# Patient Record
Sex: FEMALE | URBAN - METROPOLITAN AREA
[De-identification: names, ages, dates, MRNs, and addresses within clinical notes are randomized per-mention and may not be internally consistent; named-entity substitution may affect disease eponyms.]

---

## 2021-10-05 NOTE — PATIENT INSTRUCTIONS
The [place] [id number] will call you with therapist options soon. I placed fasting lab orders through THE [facility]. Schedule your physical with pap. We will call you once we have authorization from your insurance to order Nexplanon.

## 2021-10-05 NOTE — PROGRESS NOTES
Subjective:   Patient ID: Zohaib Terrell is a 19year old female. HPI Here to establish care. Would like to discuss birth control options. LMP was 47 days ago. Does have irregular periods. Currently sexually with a male partner, using condoms.  No known normal.         Assessment & Plan:   Mild episode of recurrent major depressive disorder ([facility] [address]  (primary encounter diagnosis)  Screening, lipid  Screening for deficiency anemia  Screening for diabetes mellitus  Screening for thyroid disorder  Vitamin D def

## 2021-10-06 ENCOUNTER — TELEPHONE (OUTPATIENT)
Dept: INTERNAL MEDICINE CLINIC | Facility: CLINIC | Age: 21
End: 2021-10-06

## 2021-10-06 NOTE — TELEPHONE ENCOUNTER
Faxed signed nexplanon enrollment form to [phone]. Confirmation received. Will hold on to forms until an answer is received.

## 2021-10-14 ENCOUNTER — TELEPHONE (OUTPATIENT)
Dept: INTERNAL MEDICINE CLINIC | Facility: CLINIC | Age: 21
End: 2021-10-14

## 2021-10-14 NOTE — TELEPHONE ENCOUNTER
Called pt's pharmacy to ask if they have nexplanon or are able to order with prescription. Pharmacist stated that they don't do this and it might have to be done with a special pharmacy. Routed this to AMS to review.

## 2021-10-15 NOTE — TELEPHONE ENCOUNTER
Please call patient to let her know she'll need to find out what her mail order option is with her insurance.  For example[facility], [facility], [facility], etc. She'll need to then sign up for the mail order and then we can send her

## 2021-10-20 NOTE — TELEPHONE ENCOUNTER
Pt called to find out what was happening with the nexplenon rx-I gave her info below from [facility] for her to set up mail order pharmacy and then call us to let us know when set up

## 2021-10-22 NOTE — TELEPHONE ENCOUNTER
Called pt to get an update regarding message below. Pt stated she will try to call today to find out which mail pharmacy option is with her insurance. Advised pt to return call and ask to speak to me if she had further questions.

## 2021-11-15 NOTE — TELEPHONE ENCOUNTER
Called pt to obtain an update on Nexplanon. Pt stated she has not had time to call her insurance to find out the specialty pharmacy. Pt stated she will call later this week to find out and call us back.

## 2021-11-19 NOTE — TELEPHONE ENCOUNTER
Pt stated she called her [employer] company spoke with 4 different people and no one was able to tell her what specialty pharmacy she will need to go thought, They told her more info is needed from the doctor to get it going.  Please advise

## 2021-11-19 NOTE — TELEPHONE ENCOUNTER
Returned pt's call. Pt stated she spoke to 4 different people and the last person who she spoke to told her they needed more information in order to initiate the process for Nexplanon. I informed pt we have not received a call or a fax.  Informed pt I will

## 2022-05-19 ENCOUNTER — HOSPITAL ENCOUNTER (EMERGENCY)
Facility: HOSPITAL | Age: 22
Discharge: HOME | End: 2022-05-19
Attending: EMERGENCY MEDICINE
Payer: COMMERCIAL

## 2022-05-19 VITALS
TEMPERATURE: 99 F | RESPIRATION RATE: 15 BRPM | HEIGHT: 63.5 IN | WEIGHT: 252.88 LBS | BODY MASS INDEX: 44.25 KG/M2 | DIASTOLIC BLOOD PRESSURE: 61 MMHG | SYSTOLIC BLOOD PRESSURE: 137 MMHG | HEART RATE: 67 BPM | OXYGEN SATURATION: 97 %

## 2022-05-19 DIAGNOSIS — R31.9 URINARY TRACT INFECTION WITH HEMATURIA, SITE UNSPECIFIED: ICD-10-CM

## 2022-05-19 DIAGNOSIS — N93.8 DUB (DYSFUNCTIONAL UTERINE BLEEDING): Primary | ICD-10-CM

## 2022-05-19 DIAGNOSIS — N39.0 URINARY TRACT INFECTION WITH HEMATURIA, SITE UNSPECIFIED: ICD-10-CM

## 2022-05-19 LAB
ALBUMIN SERPL-MCNC: 3.4 G/DL (ref 3.4–5)
ALBUMIN/GLOB SERPL: 0.7 {RATIO} (ref 1–2)
ALP LIVER SERPL-CCNC: 55 U/L
ALT SERPL-CCNC: 21 U/L
ANION GAP SERPL CALC-SCNC: 6 MMOL/L (ref 0–18)
AST SERPL-CCNC: 10 U/L (ref 15–37)
B-HCG UR QL: NEGATIVE
BASOPHILS # BLD AUTO: 0.06 X10(3) UL (ref 0–0.2)
BASOPHILS NFR BLD AUTO: 0.4 %
BILIRUB SERPL-MCNC: 0.4 MG/DL (ref 0.1–2)
BILIRUB UR QL CFM: NEGATIVE
BUN BLD-MCNC: 18 MG/DL (ref 7–18)
CALCIUM BLD-MCNC: 8.4 MG/DL (ref 8.5–10.1)
CHLORIDE SERPL-SCNC: 110 MMOL/L (ref 98–112)
CO2 SERPL-SCNC: 24 MMOL/L (ref 21–32)
CREAT BLD-MCNC: 0.64 MG/DL
EOSINOPHIL # BLD AUTO: 0.07 X10(3) UL (ref 0–0.7)
EOSINOPHIL NFR BLD AUTO: 0.5 %
ERYTHROCYTE [DISTWIDTH] IN BLOOD BY AUTOMATED COUNT: 17.3 %
GLOBULIN PLAS-MCNC: 4.9 G/DL (ref 2.8–4.4)
GLUCOSE BLD-MCNC: 88 MG/DL (ref 70–99)
GLUCOSE UR STRIP.AUTO-MCNC: NEGATIVE MG/DL
HCT VFR BLD AUTO: 37.6 %
HGB BLD-MCNC: 11.5 G/DL
IMM GRANULOCYTES # BLD AUTO: 0.05 X10(3) UL (ref 0–1)
IMM GRANULOCYTES NFR BLD: 0.4 %
LYMPHOCYTES # BLD AUTO: 4.31 X10(3) UL (ref 1–4)
LYMPHOCYTES NFR BLD AUTO: 31.8 %
MCH RBC QN AUTO: 22.7 PG (ref 26–34)
MCHC RBC AUTO-ENTMCNC: 30.6 G/DL (ref 31–37)
MCV RBC AUTO: 74.3 FL
MONOCYTES # BLD AUTO: 0.76 X10(3) UL (ref 0.1–1)
MONOCYTES NFR BLD AUTO: 5.6 %
NEUTROPHILS # BLD AUTO: 8.32 X10 (3) UL (ref 1.5–7.7)
NEUTROPHILS # BLD AUTO: 8.32 X10(3) UL (ref 1.5–7.7)
NEUTROPHILS NFR BLD AUTO: 61.3 %
NITRITE UR QL STRIP.AUTO: POSITIVE
OSMOLALITY SERPL CALC.SUM OF ELEC: 291 MOSM/KG (ref 275–295)
PH UR STRIP.AUTO: 6 [PH] (ref 5–8)
PLATELET # BLD AUTO: 308 10(3)UL (ref 150–450)
POTASSIUM SERPL-SCNC: 3.4 MMOL/L (ref 3.5–5.1)
PROT SERPL-MCNC: 8.3 G/DL (ref 6.4–8.2)
PROT UR STRIP.AUTO-MCNC: >=300 MG/DL
RBC # BLD AUTO: 5.06 X10(6)UL
RBC #/AREA URNS AUTO: >10 /HPF
SODIUM SERPL-SCNC: 140 MMOL/L (ref 136–145)
SP GR UR STRIP.AUTO: 1.02 (ref 1–1.03)
UROBILINOGEN UR STRIP.AUTO-MCNC: 1 MG/DL
WBC # BLD AUTO: 13.6 X10(3) UL (ref 4–11)

## 2022-05-19 PROCEDURE — 81015 MICROSCOPIC EXAM OF URINE: CPT | Performed by: EMERGENCY MEDICINE

## 2022-05-19 PROCEDURE — 81025 URINE PREGNANCY TEST: CPT

## 2022-05-19 PROCEDURE — 96360 HYDRATION IV INFUSION INIT: CPT

## 2022-05-19 PROCEDURE — 80053 COMPREHEN METABOLIC PANEL: CPT

## 2022-05-19 PROCEDURE — 99284 EMERGENCY DEPT VISIT MOD MDM: CPT

## 2022-05-19 PROCEDURE — 85025 COMPLETE CBC W/AUTO DIFF WBC: CPT | Performed by: EMERGENCY MEDICINE

## 2022-05-19 PROCEDURE — 87086 URINE CULTURE/COLONY COUNT: CPT | Performed by: EMERGENCY MEDICINE

## 2022-05-19 PROCEDURE — 80053 COMPREHEN METABOLIC PANEL: CPT | Performed by: EMERGENCY MEDICINE

## 2022-05-19 PROCEDURE — 81001 URINALYSIS AUTO W/SCOPE: CPT | Performed by: EMERGENCY MEDICINE

## 2022-05-19 PROCEDURE — 85025 COMPLETE CBC W/AUTO DIFF WBC: CPT

## 2022-05-19 RX ORDER — ETONOGESTREL 68 MG/1
IMPLANT SUBCUTANEOUS
COMMUNITY

## 2022-05-19 RX ORDER — NITROFURANTOIN 25; 75 MG/1; MG/1
100 CAPSULE ORAL 2 TIMES DAILY
Qty: 20 CAPSULE | Refills: 0 | Status: SHIPPED | OUTPATIENT
Start: 2022-05-19 | End: 2022-05-29

## 2022-05-19 RX ORDER — NAPROXEN 500 MG/1
500 TABLET ORAL 2 TIMES DAILY PRN
Qty: 20 TABLET | Refills: 0 | Status: SHIPPED | OUTPATIENT
Start: 2022-05-19

## 2022-05-19 RX ORDER — ACETAMINOPHEN 500 MG
1000 TABLET ORAL EVERY 6 HOURS PRN
COMMUNITY

## 2022-05-19 NOTE — ED INITIAL ASSESSMENT (HPI)
Had Nexplenon inserted on the 29th of April. On second day of menstrual cycle and is passing clots, period is so heavy. Gone through six pads today. C/o dizziness. Hasn't had a period this heavy before.

## 2024-04-22 ENCOUNTER — OFFICE VISIT (OUTPATIENT)
Dept: FAMILY MEDICINE CLINIC | Facility: CLINIC | Age: 24
End: 2024-04-22
Payer: COMMERCIAL

## 2024-04-22 VITALS
BODY MASS INDEX: 46.02 KG/M2 | HEIGHT: 63.5 IN | TEMPERATURE: 99 F | RESPIRATION RATE: 16 BRPM | WEIGHT: 263 LBS | HEART RATE: 83 BPM | SYSTOLIC BLOOD PRESSURE: 120 MMHG | OXYGEN SATURATION: 98 % | DIASTOLIC BLOOD PRESSURE: 80 MMHG

## 2024-04-22 DIAGNOSIS — R09.82 PND (POST-NASAL DRIP): ICD-10-CM

## 2024-04-22 DIAGNOSIS — J01.90 ACUTE SINUSITIS WITH SYMPTOMS GREATER THAN 10 DAYS: Primary | ICD-10-CM

## 2024-04-22 PROCEDURE — 99213 OFFICE O/P EST LOW 20 MIN: CPT | Performed by: NURSE PRACTITIONER

## 2024-04-22 RX ORDER — FLUTICASONE PROPIONATE 50 MCG
2 SPRAY, SUSPENSION (ML) NASAL DAILY
Qty: 1 EACH | Refills: 0 | Status: SHIPPED | OUTPATIENT
Start: 2024-04-22

## 2024-04-22 RX ORDER — BENZONATATE 200 MG/1
200 CAPSULE ORAL 3 TIMES DAILY PRN
Qty: 30 CAPSULE | Refills: 0 | Status: SHIPPED | OUTPATIENT
Start: 2024-04-22

## 2024-04-22 RX ORDER — AMOXICILLIN AND CLAVULANATE POTASSIUM 875; 125 MG/1; MG/1
1 TABLET, FILM COATED ORAL 2 TIMES DAILY
Qty: 20 TABLET | Refills: 0 | Status: SHIPPED | OUTPATIENT
Start: 2024-04-22 | End: 2024-05-02

## 2024-04-22 NOTE — PROGRESS NOTES
Anu Vargas is a 23 year old female.   Chief Complaint   Patient presents with    Cough     Cough mucous throat irritation, in morning is worse with stuffy nose, x 2 weeks.  Stated had a cold      HPI:    Symptoms started 2 weeks ago with purulent nasal discharge, maxillary sinus pressure, and headache, a lot of  post-nasal drip.Worsening.    Allergies:  Allergies   Allergen Reactions    Black Ralph Pollen Allergy Skin Test ANAPHYLAXIS    Soybean Oil ANAPHYLAXIS          Current Outpatient Medications   Medication Sig Dispense Refill    amoxicillin clavulanate 875-125 MG Oral Tab Take 1 tablet by mouth 2 (two) times daily for 10 days. 20 tablet 0    fluticasone propionate 50 MCG/ACT Nasal Suspension 2 sprays by Each Nare route daily. 1 each 0    benzonatate 200 MG Oral Cap Take 1 capsule (200 mg total) by mouth 3 (three) times daily as needed. 30 capsule 0    Etonogestrel (NEXPLANON) 68 MG Subcutaneous Implant Inject into the skin.      acetaminophen 500 MG Oral Tab Take 1,000 mg by mouth every 6 (six) hours as needed for Pain. (Patient not taking: Reported on 4/22/2024)      naproxen 500 MG Oral Tab Take 1 tablet (500 mg total) by mouth 2 (two) times daily as needed. (Patient not taking: Reported on 4/22/2024) 20 tablet 0      No past medical history on file.   No past surgical history on file.         ROS:   GENERAL HEALTH: otherwise feels well  EYES: no visual complaints or deficits  RESPIRATORY: no shortness of breath, wheezing   CARDIOVASCULAR: no chest pain or SOB.  GI: denies nausea, vomiting.  PSYCHE: no symptoms of depression or anxiety      PE:  /80   Pulse 83   Temp 98.6 °F (37 °C) (Oral)   Resp 16   Ht 5' 3.5\" (1.613 m)   Wt 263 lb (119.3 kg)   SpO2 98%   BMI 45.86 kg/m²   General: WD/WN in no acute distress.   Eyes & ears: conjunctiva clear, sclera white; TM’s non-bulging without erythema.   Sinuses maxillary: tender to percussion.   Nares: red mucosa and thick yellow discharge, no  septal deviations.   Mouth: moist with mild erythema, no exudates, and + PND.   Neck: Supple with no cervical LAD.   Lungs: Clear to auscultation bilaterally; no wheeze, rhonchi, or rales.    Heart: S1/S2 regular no murmurs.      ASSESSMENT/ PLAN:     Encounter Diagnoses   Name Primary?    Acute sinusitis with symptoms greater than 10 days Yes    PND (post-nasal drip)        Meds & Refills for this Visit:  Requested Prescriptions     Signed Prescriptions Disp Refills    amoxicillin clavulanate 875-125 MG Oral Tab 20 tablet 0     Sig: Take 1 tablet by mouth 2 (two) times daily for 10 days.    fluticasone propionate 50 MCG/ACT Nasal Suspension 1 each 0     Si sprays by Each Nare route daily.    benzonatate 200 MG Oral Cap 30 capsule 0     Sig: Take 1 capsule (200 mg total) by mouth 3 (three) times daily as needed.       Imaging & Consults:  None    Increase fluids, rest, Tylenol or Ibuprofen prn, f/u in 5 days if not better.

## 2024-08-28 ENCOUNTER — OFFICE VISIT (OUTPATIENT)
Dept: FAMILY MEDICINE CLINIC | Facility: CLINIC | Age: 24
End: 2024-08-28
Payer: COMMERCIAL

## 2024-08-28 VITALS
RESPIRATION RATE: 18 BRPM | OXYGEN SATURATION: 98 % | WEIGHT: 267 LBS | DIASTOLIC BLOOD PRESSURE: 76 MMHG | TEMPERATURE: 98 F | BODY MASS INDEX: 47 KG/M2 | HEART RATE: 102 BPM | SYSTOLIC BLOOD PRESSURE: 122 MMHG

## 2024-08-28 DIAGNOSIS — R09.82 POST-NASAL DRIP: ICD-10-CM

## 2024-08-28 DIAGNOSIS — H69.93 DYSFUNCTION OF BOTH EUSTACHIAN TUBES: Primary | ICD-10-CM

## 2024-08-28 PROCEDURE — 99213 OFFICE O/P EST LOW 20 MIN: CPT

## 2024-08-28 NOTE — PROGRESS NOTES
Subjective:   Patient ID: Anu Vargas is a 23 year old female.    Patient presents with 2 days of bilateral ear pain, sore throat and runny nose. History of seasonal allergies. Reports taking nyquil for symptoms. Denies exposures or other members in house with same symptoms.     Ear Problem   There is pain in both ears. This is a new problem. The current episode started yesterday. The problem occurs every few hours. The problem has been unchanged. There has been no fever. Associated symptoms include rhinorrhea and a sore throat. Pertinent negatives include no ear discharge.       History/Other:   Review of Systems   Constitutional:  Negative for fever.   HENT:  Positive for ear pain, rhinorrhea and sore throat. Negative for ear discharge.    Respiratory:  Negative for chest tightness and shortness of breath.    Cardiovascular:  Negative for chest pain.   All other systems reviewed and are negative.    Current Outpatient Medications   Medication Sig Dispense Refill    Etonogestrel (NEXPLANON) 68 MG Subcutaneous Implant Inject into the skin.      fluticasone propionate 50 MCG/ACT Nasal Suspension 2 sprays by Each Nare route daily. (Patient not taking: Reported on 8/28/2024) 1 each 0    benzonatate 200 MG Oral Cap Take 1 capsule (200 mg total) by mouth 3 (three) times daily as needed. (Patient not taking: Reported on 8/28/2024) 30 capsule 0    acetaminophen 500 MG Oral Tab Take 1,000 mg by mouth every 6 (six) hours as needed for Pain. (Patient not taking: Reported on 4/22/2024)      naproxen 500 MG Oral Tab Take 1 tablet (500 mg total) by mouth 2 (two) times daily as needed. (Patient not taking: Reported on 4/22/2024) 20 tablet 0     Allergies:  Allergies   Allergen Reactions    Black Kensett Pollen Allergy Skin Test ANAPHYLAXIS    Soybean Oil ANAPHYLAXIS       Objective:   Physical Exam  Vitals reviewed.   Constitutional:       General: She is not in acute distress.     Appearance: Normal appearance. She is obese.  She is not ill-appearing.   HENT:      Head: Normocephalic and atraumatic.      Right Ear: Ear canal and external ear normal. A middle ear effusion is present. Tympanic membrane is not erythematous, retracted or bulging.      Left Ear: Ear canal and external ear normal. A middle ear effusion is present. Tympanic membrane is not erythematous, retracted or bulging.      Nose: Congestion present.      Right Turbinates: Swollen.      Left Turbinates: Swollen.      Right Sinus: No maxillary sinus tenderness or frontal sinus tenderness.      Left Sinus: No maxillary sinus tenderness or frontal sinus tenderness.      Mouth/Throat:      Mouth: Mucous membranes are moist.      Pharynx: Oropharynx is clear. Uvula midline. No oropharyngeal exudate, posterior oropharyngeal erythema or uvula swelling.      Tonsils: No tonsillar exudate or tonsillar abscesses. 1+ on the right. 1+ on the left.      Comments: Non-purulent drainage  Cardiovascular:      Rate and Rhythm: Normal rate and regular rhythm.      Pulses: Normal pulses.      Heart sounds: Normal heart sounds.   Pulmonary:      Effort: Pulmonary effort is normal.      Breath sounds: Normal breath sounds.   Musculoskeletal:         General: Normal range of motion.      Cervical back: Normal range of motion and neck supple.   Lymphadenopathy:      Cervical: No cervical adenopathy.   Skin:     General: Skin is warm and dry.   Neurological:      General: No focal deficit present.      Mental Status: She is alert and oriented to person, place, and time.   Psychiatric:         Mood and Affect: Mood normal.         Behavior: Behavior normal.         Assessment & Plan:   1. Dysfunction of both eustachian tubes    2. Post-nasal drip        Discussion about probable viral/allergy cause of symptoms. Discussion about supportive treatment including Claritin, Flonase, salt water gargles and hydration. Declined viral testing.    Meds This Visit:  Requested Prescriptions      No  prescriptions requested or ordered in this encounter       Imaging & Referrals:  None

## 2025-03-12 ENCOUNTER — HOSPITAL ENCOUNTER (EMERGENCY)
Facility: HOSPITAL | Age: 25
Discharge: HOME | End: 2025-03-13
Attending: EMERGENCY MEDICINE
Payer: COMMERCIAL

## 2025-03-12 DIAGNOSIS — M79.10 MYALGIA: Primary | ICD-10-CM

## 2025-03-12 DIAGNOSIS — R20.2 PARESTHESIAS: ICD-10-CM

## 2025-03-12 PROCEDURE — 99283 EMERGENCY DEPT VISIT LOW MDM: CPT

## 2025-03-12 PROCEDURE — 93010 ELECTROCARDIOGRAM REPORT: CPT

## 2025-03-12 PROCEDURE — 93005 ELECTROCARDIOGRAM TRACING: CPT

## 2025-03-13 VITALS
RESPIRATION RATE: 18 BRPM | HEIGHT: 64 IN | DIASTOLIC BLOOD PRESSURE: 97 MMHG | TEMPERATURE: 98 F | OXYGEN SATURATION: 100 % | HEART RATE: 79 BPM | WEIGHT: 280 LBS | SYSTOLIC BLOOD PRESSURE: 137 MMHG | BODY MASS INDEX: 47.8 KG/M2

## 2025-03-13 LAB
ATRIAL RATE: 98 BPM
P AXIS: 25 DEGREES
P-R INTERVAL: 130 MS
Q-T INTERVAL: 374 MS
QRS DURATION: 96 MS
QTC CALCULATION (BEZET): 477 MS
R AXIS: 61 DEGREES
T AXIS: 22 DEGREES
VENTRICULAR RATE: 98 BPM

## 2025-03-13 RX ORDER — NAPROXEN 250 MG/1
500 TABLET ORAL ONCE
Status: COMPLETED | OUTPATIENT
Start: 2025-03-13 | End: 2025-03-13

## 2025-03-13 RX ORDER — METHOCARBAMOL 500 MG/1
500 TABLET, FILM COATED ORAL 4 TIMES DAILY
Qty: 20 TABLET | Refills: 0 | Status: SHIPPED | OUTPATIENT
Start: 2025-03-13 | End: 2025-03-18

## 2025-03-13 RX ORDER — NAPROXEN 500 MG/1
500 TABLET ORAL 2 TIMES DAILY PRN
Qty: 20 TABLET | Refills: 0 | Status: SHIPPED | OUTPATIENT
Start: 2025-03-13 | End: 2025-03-20

## 2025-03-13 RX ORDER — METHOCARBAMOL 500 MG/1
500 TABLET, FILM COATED ORAL ONCE
Status: COMPLETED | OUTPATIENT
Start: 2025-03-13 | End: 2025-03-13

## 2025-03-13 NOTE — ED INITIAL ASSESSMENT (HPI)
Pt ambulated into the ED with c/o left arm pain since this morning that has progressed to a numbing sensation in her mid-upper arm. Cap refill <3

## 2025-03-16 NOTE — ED PROVIDER NOTES
Patient Seen in: [facility] Emergency Department      History     Chief Complaint   Patient presents with    Numbness     Stated Complaint: L arm pain since this AM, L arm numbness x 1 hour    Subjective:   HPI    Patient is a 24-year-old female presenting to the ED with left upper extremity pain associate with paresthesias.  The history is obtained from patient who is a good historian.  The onset of her symptoms was earlier this morning.  No associated swelling.  The pain and paresthesias are isolated to the left forearm during my examination although it was reported to the mid upper arm during triage.  No chest pain or shortness of breath.  No complaints of neck pain.  No weakness or true loss of sensation.  No recent trauma or identifiable strain.  No similar episodes in the past.  Upon arrival pain is rated as 9 out of 10, nonradiating, left arm and forearm region, not well-characterized.  It is worse with palpation, no identified alleviating factors.  Patient denies pregnancy, has a Nexplanon.      Objective:     History reviewed. No pertinent past medical history.           History reviewed. No pertinent surgical history.             Social History     Socioeconomic History    Marital status: Single   Tobacco Use    Smoking status: Never    Smokeless tobacco: Never   Vaping Use    Vaping status: Never Used   Substance and Sexual Activity    Alcohol use: Not Currently    Drug use: Never     Social Drivers of Health      Received from [facility]    Housing Stability                  Physical Exam     ED Triage Vitals   BP 03/12/25 2133 (!) 166/93   Pulse 03/12/25 2133 90   Resp 03/12/25 2133 18   Temp 03/12/25 2133 97.8 °F (36.6 °C)   Temp src 03/12/25 2133 Temporal   SpO2 03/12/25 2133 98 %   O2 Device 03/13/25 0005 None (Room air)       Current Vitals:   No data recorded    Physical Exam  Vitals and nursing note reviewed.   Constitutional:       General: She is not in acute  distress.     Appearance: Normal appearance. She is well-developed. She is not ill-appearing or toxic-appearing.   HENT:      Head: Normocephalic and atraumatic.      Right Ear: External ear normal.      Left Ear: External ear normal.      Mouth/Throat:      Mouth: Mucous membranes are moist.      Pharynx: Oropharynx is clear.   Eyes:      Conjunctiva/sclera: Conjunctivae normal.   Neck:      Comments: No midline or left paravertebral cervical or upper thoracic tenderness  Cardiovascular:      Rate and Rhythm: Normal rate and regular rhythm.      Pulses: Normal pulses.      Heart sounds: No murmur heard.     Comments: Radial pulse 2/4 bilaterally  Pulmonary:      Effort: Pulmonary effort is normal.      Breath sounds: Normal breath sounds.   Abdominal:      General: Abdomen is flat. Bowel sounds are normal. There is no distension.      Tenderness: There is no abdominal tenderness.   Musculoskeletal:         General: Tenderness present. No swelling, deformity or signs of injury. Normal range of motion.      Cervical back: No tenderness.      Right lower leg: No edema.      Left lower leg: No edema.      Comments: Reproducible tenderness over the left forearm.  Full active range of motion of bilateral upper extremities without pain elicited during exam.   Skin:     General: Skin is warm.      Capillary Refill: Capillary refill takes less than 2 seconds.      Findings: No rash.   Neurological:      General: No focal deficit present.      Mental Status: She is alert and oriented to person, place, and time.      Sensory: No sensory deficit.      Motor: No weakness.      Comments: Motor strength 5/5 bilateral upper and lower extremity.  Sensation is intact bilaterally   Psychiatric:         Mood and Affect: Mood normal.         Behavior: Behavior normal.             ED Course   Labs Reviewed - No data to display  EKG    Rate, intervals and axes as noted on EKG Report.  Rate: 98  Rhythm: Sinus Rhythm  Reading: Normal                        MDM      History obtained from patient.     Differential diagnosis includes peripheral neuropathy, musculoskeletal pain or strain, symptoms do not involve the entire left upper extremity therefore this is not likely secondary to a TIA or CVA.  Cervical radiculopathy was considered although patient does not have any neck pain or tenderness on exam.  Symptoms are isolated to the left forearm.  Reproducible with palpation.  No evidence of trauma or bruising.  No swelling to suggest DVT and pain isolated to the forearm.  No obvious deformity or trauma to suggest fracture.  No rash or fever to suggest infectious cause for symptoms.  Patient has had paresthesias but no true loss of sensation elicited on exam.  Motor strength is equal.  No chest pain, shortness of breath to suggest cardiac etiology of her symptoms and patient does not have any significant risk factors for cardiac disease.  Normal EKG.    Previous records reviewed.  Patient's last office visit was August 2024 for postnasal drip and dysfunction of the eustachian tubes.     Testing considered and ordered includes EKG was obtained in triage.  Patient does not have chest pain or shortness of breath.  After seeing and assessing patient, she does not want any additional testing but would like to try medications to try to help alleviate her pain or discomfort.  Muscle relaxant Robaxin, and Naprosyn ordered in the ED for patient.    Interventions in care included naproxen and Robaxin.    Discussed follow-up for reevaluation of symptoms return to the ED if any symptoms worsen, persist, new symptoms develop.  Discussed using NSAIDs and muscle relaxants as well as avoiding driving while taking muscle relaxants as they may cause sedation.  Patient was comfortable discharge plan.  Also advised rest until symptoms improved.              Medical Decision Making      Disposition and Plan     Clinical Impression:  1. Myalgia    2. Paresthesias          Disposition:  Discharge  3/13/2025  2:31 am    Follow-up:  E[facility]  [address]  [facility] [address]  [phone]    Schedule an appointment as soon as possible for a visit in 2 day(s)      [facility] Emergency Department  [address] [place] [id number]  [phone]  Follow up  IF SYMPTOMS WORSEN, PERSIST, OR NEW SYMPTOMS DEVEL          Medications Prescribed:  Discharge Medication List as of 3/13/2025  2:58 AM        START taking these medications    Details   methocarbamol 500 MG Oral Tab Take 1 tablet (500 mg total) by mouth 4 (four) times daily for 5 days., Normal, Disp-20 tablet, R-0      !! naproxen 500 MG Oral Tab Take 1 tablet (500 mg total) by mouth 2 (two) times daily as needed., Normal, Disp-20 tablet, R-0       !! - Potential duplicate medications found. Please discuss with provider.              Supplementary Documentation:

## (undated) NOTE — LETTER
Date: 8/28/2024    Patient Name: Anu Vargas          To Whom it may concern:    This letter has been written at the patient's request. The above patient was seen at [facility] for treatment of a medical condition.    This patient should be excused from attending work on 08/28/2024.    The patient may return to work on 08/29/2024.        Sincerely,          LITTLE Osorio

## (undated) NOTE — LETTER
Date & Time: 3/13/2025, 2:57 AM  Patient: Anu Vargas  Encounter Provider(s):    Cat Finley DO       To Whom It May Concern:    Anu Vargas was seen and treated in our department on 3/12/2025. She should not return to work until 3/14/2025 .    If you have any questions or concerns, please do not hesitate to call.        _____________________________  Physician/APC Signature

## (undated) NOTE — LETTER
Date: 4/22/2024    Patient Name: Anu Vargas          To Whom it may concern:    This letter has been written at the patient's request. The above patient was seen at [facility] for treatment of a medical condition.    This patient should be excused from attending work on 04/22/2024.    The patient may return to work on 04/23/2024 with no limitations.        Sincerely,      LITTLE Godwin